# Patient Record
Sex: FEMALE | Race: OTHER | HISPANIC OR LATINO
[De-identification: names, ages, dates, MRNs, and addresses within clinical notes are randomized per-mention and may not be internally consistent; named-entity substitution may affect disease eponyms.]

---

## 2021-04-22 PROBLEM — Z00.00 ENCOUNTER FOR PREVENTIVE HEALTH EXAMINATION: Status: ACTIVE | Noted: 2021-04-22

## 2021-05-18 ENCOUNTER — APPOINTMENT (OUTPATIENT)
Dept: SURGERY | Facility: CLINIC | Age: 51
End: 2021-05-18
Payer: COMMERCIAL

## 2021-05-18 VITALS
BODY MASS INDEX: 30.24 KG/M2 | WEIGHT: 150 LBS | DIASTOLIC BLOOD PRESSURE: 76 MMHG | SYSTOLIC BLOOD PRESSURE: 128 MMHG | HEIGHT: 59 IN | HEART RATE: 82 BPM

## 2021-05-18 DIAGNOSIS — Z80.3 FAMILY HISTORY OF MALIGNANT NEOPLASM OF BREAST: ICD-10-CM

## 2021-05-18 DIAGNOSIS — Z87.891 PERSONAL HISTORY OF NICOTINE DEPENDENCE: ICD-10-CM

## 2021-05-18 DIAGNOSIS — Z78.9 OTHER SPECIFIED HEALTH STATUS: ICD-10-CM

## 2021-05-18 DIAGNOSIS — Z78.0 ASYMPTOMATIC MENOPAUSAL STATE: ICD-10-CM

## 2021-05-18 DIAGNOSIS — Z86.39 PERSONAL HISTORY OF OTHER ENDOCRINE, NUTRITIONAL AND METABOLIC DISEASE: ICD-10-CM

## 2021-05-18 PROCEDURE — 99204 OFFICE O/P NEW MOD 45 MIN: CPT

## 2021-05-18 RX ORDER — FAMOTIDINE 40 MG/1
40 TABLET, FILM COATED ORAL
Qty: 90 | Refills: 0 | Status: COMPLETED | COMMUNITY
Start: 2021-02-17

## 2021-05-18 RX ORDER — MELOXICAM 7.5 MG/1
7.5 TABLET ORAL
Qty: 30 | Refills: 0 | Status: COMPLETED | COMMUNITY
Start: 2021-04-15

## 2021-05-18 RX ORDER — ATORVASTATIN CALCIUM 40 MG/1
40 TABLET, FILM COATED ORAL
Qty: 90 | Refills: 0 | Status: ACTIVE | COMMUNITY
Start: 2021-02-17

## 2021-05-19 PROBLEM — Z78.0 HISTORY OF MENOPAUSE: Status: RESOLVED | Noted: 2021-05-19 | Resolved: 2021-05-19

## 2021-05-19 NOTE — ASSESSMENT
[FreeTextEntry1] : lengthy discussion regarding options for management. in view of labs and symptoms have recommended minimally invasive parathyroidectomy with PTH assay.  will require preop 4D CT and thyroid sonogram  risks, benefits and alternatives discussed at length.  I have discussed with the patient the anatomy of the area, the pathophysiology of the disease process and the rationale for surgery.  The attendant risks, possible complications and expected postoperative course have been discussed in detail.  I have given the patient the opportunity to ask questions, and all questions have been answered to the patient's satisfaction, and they wish to proceed with the planned procedure. to be scheduled ambulatory at [facility]

## 2021-05-19 NOTE — HISTORY OF PRESENT ILLNESS
[de-identified] : Pt c/o elevated calcium for 4 years,  bone pain and fatigue.  denies constipation or kidney stones, dysphagia, hoarseness or RT exposure. \par Ca 10.4,  PTH 88.6,  Vitamin  D 46,   24 Hr Urinary  Ca 145\par renal sonogram: no stones\par bone density: osteoporosis\par I have reviewed all old and new data and available images.  Additional information was obtained from others present at the time of visit to ensure the completeness of the history\par

## 2021-05-19 NOTE — REASON FOR VISIT
[Initial Consultation] : an initial consultation for [Spouse] : spouse [FreeTextEntry2] : Hyperparathyroidism

## 2021-05-19 NOTE — CONSULT LETTER
[Dear  ___] : Dear  [unfilled], [Consult Letter:] : I had the pleasure of evaluating your patient, [unfilled]. [Please see my note below.] : Please see my note below. [Consult Closing:] : Thank you very much for allowing me to participate in the care of this patient.  If you have any questions, please do not hesitate to contact me. [Sincerely,] : Sincerely, [FreeTextEntry2] : Dr. Sofie Cali, Dr. Kristen Caraballo [FreeTextEntry3] : Vega Olguin MD, FACS\par System Director, Endocrine Surgery\par [facility]\par Assistant Clinical Professor of Surgery\par [facility] [employer] [facility]  [DrBrett  ___] : Dr. FENG

## 2021-05-19 NOTE — PHYSICAL EXAM
[de-identified] : no palpable thyroid nodules [Laryngoscopy Performed] : laryngoscopy was performed, see procedure section for findings [Midline] : located in midline position [Normal] : orientation to person, place, and time: normal [de-identified] : indirect  laryngoscopy shows normal vocal cord mobility bilaterally with no lesions noted

## 2021-05-27 ENCOUNTER — OUTPATIENT (OUTPATIENT)
Dept: OUTPATIENT SERVICES | Facility: HOSPITAL | Age: 51
LOS: 1 days | End: 2021-05-27
Payer: COMMERCIAL

## 2021-05-27 ENCOUNTER — APPOINTMENT (OUTPATIENT)
Dept: CT IMAGING | Facility: IMAGING CENTER | Age: 51
End: 2021-05-27
Payer: COMMERCIAL

## 2021-05-27 ENCOUNTER — APPOINTMENT (OUTPATIENT)
Dept: ULTRASOUND IMAGING | Facility: IMAGING CENTER | Age: 51
End: 2021-05-27
Payer: COMMERCIAL

## 2021-05-27 DIAGNOSIS — E21.0 PRIMARY HYPERPARATHYROIDISM: ICD-10-CM

## 2021-05-27 DIAGNOSIS — R31.0 GROSS HEMATURIA: ICD-10-CM

## 2021-05-27 PROCEDURE — 70491 CT SOFT TISSUE NECK W/DYE: CPT | Mod: 26

## 2021-05-27 PROCEDURE — 76536 US EXAM OF HEAD AND NECK: CPT

## 2021-05-27 PROCEDURE — 76536 US EXAM OF HEAD AND NECK: CPT | Mod: 26

## 2021-05-27 PROCEDURE — 70492 CT SFT TSUE NCK W/O & W/DYE: CPT

## 2021-05-27 PROCEDURE — 82565 ASSAY OF CREATININE: CPT

## 2021-06-07 ENCOUNTER — NON-APPOINTMENT (OUTPATIENT)
Age: 51
End: 2021-06-07

## 2021-07-19 ENCOUNTER — OUTPATIENT (OUTPATIENT)
Dept: OUTPATIENT SERVICES | Facility: HOSPITAL | Age: 51
LOS: 1 days | End: 2021-07-19

## 2021-07-19 VITALS
DIASTOLIC BLOOD PRESSURE: 80 MMHG | OXYGEN SATURATION: 97 % | HEART RATE: 87 BPM | SYSTOLIC BLOOD PRESSURE: 125 MMHG | TEMPERATURE: 99 F | RESPIRATION RATE: 16 BRPM | WEIGHT: 154.98 LBS | HEIGHT: 58.5 IN

## 2021-07-19 DIAGNOSIS — E21.0 PRIMARY HYPERPARATHYROIDISM: ICD-10-CM

## 2021-07-19 DIAGNOSIS — E21.3 HYPERPARATHYROIDISM, UNSPECIFIED: ICD-10-CM

## 2021-07-19 LAB
ANION GAP SERPL CALC-SCNC: 15 MMOL/L — HIGH (ref 7–14)
BUN SERPL-MCNC: 19 MG/DL — SIGNIFICANT CHANGE UP (ref 7–23)
CALCIUM SERPL-MCNC: 10.6 MG/DL — HIGH (ref 8.4–10.5)
CHLORIDE SERPL-SCNC: 103 MMOL/L — SIGNIFICANT CHANGE UP (ref 98–107)
CO2 SERPL-SCNC: 23 MMOL/L — SIGNIFICANT CHANGE UP (ref 22–31)
CREAT SERPL-MCNC: 0.63 MG/DL — SIGNIFICANT CHANGE UP (ref 0.5–1.3)
GLUCOSE SERPL-MCNC: 98 MG/DL — SIGNIFICANT CHANGE UP (ref 70–99)
HCG UR QL: NEGATIVE — SIGNIFICANT CHANGE UP
HCT VFR BLD CALC: 43.6 % — SIGNIFICANT CHANGE UP (ref 34.5–45)
HGB BLD-MCNC: 14.3 G/DL — SIGNIFICANT CHANGE UP (ref 11.5–15.5)
MCHC RBC-ENTMCNC: 32.4 PG — SIGNIFICANT CHANGE UP (ref 27–34)
MCHC RBC-ENTMCNC: 32.8 GM/DL — SIGNIFICANT CHANGE UP (ref 32–36)
MCV RBC AUTO: 98.6 FL — SIGNIFICANT CHANGE UP (ref 80–100)
NRBC # BLD: 0 /100 WBCS — SIGNIFICANT CHANGE UP
NRBC # FLD: 0 K/UL — SIGNIFICANT CHANGE UP
PLATELET # BLD AUTO: 291 K/UL — SIGNIFICANT CHANGE UP (ref 150–400)
POTASSIUM SERPL-MCNC: 4.2 MMOL/L — SIGNIFICANT CHANGE UP (ref 3.5–5.3)
POTASSIUM SERPL-SCNC: 4.2 MMOL/L — SIGNIFICANT CHANGE UP (ref 3.5–5.3)
RBC # BLD: 4.42 M/UL — SIGNIFICANT CHANGE UP (ref 3.8–5.2)
RBC # FLD: 12.3 % — SIGNIFICANT CHANGE UP (ref 10.3–14.5)
SODIUM SERPL-SCNC: 141 MMOL/L — SIGNIFICANT CHANGE UP (ref 135–145)
WBC # BLD: 5.95 K/UL — SIGNIFICANT CHANGE UP (ref 3.8–10.5)
WBC # FLD AUTO: 5.95 K/UL — SIGNIFICANT CHANGE UP (ref 3.8–10.5)

## 2021-07-19 RX ORDER — SODIUM CHLORIDE 9 MG/ML
1000 INJECTION, SOLUTION INTRAVENOUS
Refills: 0 | Status: DISCONTINUED | OUTPATIENT
Start: 2021-07-30 | End: 2021-08-13

## 2021-07-19 NOTE — H&P PST ADULT - NSICDXPASTMEDICALHX_GEN_ALL_CORE_FT
PAST MEDICAL HISTORY:  H/O hyperparathyroidism     HLD (hyperlipidemia)      (normal spontaneous vaginal delivery) x 2

## 2021-07-19 NOTE — H&P PST ADULT - NSICDXFAMILYHX_GEN_ALL_CORE_FT
FAMILY HISTORY:  Father  Still living? Yes, Estimated age: Age Unknown  Family history of diabetes mellitus (DM), Age at diagnosis: Age Unknown    Mother  Still living? No  Family history of emphysema, Age at diagnosis: Age Unknown

## 2021-07-19 NOTE — H&P PST ADULT - NSANTHOSAYNRD_GEN_A_CORE
No. PALAK screening performed.  STOP BANG Legend: 0-2 = LOW Risk; 3-4 = INTERMEDIATE Risk; 5-8 = HIGH Risk

## 2021-07-19 NOTE — H&P PST ADULT - HISTORY OF PRESENT ILLNESS
50 y/o female with hx elevated calcium levels x 3 years  50 y/o female with hx elevated calcium levels x 3 years. Pt started c/o severe joint pains, was referred to Dr Olguin for further evaluation & surgery recommended.  Now scheduled for parathyroidectomy with parathyroid Hormone assay on 7/30/21.   Pt has hx HLD

## 2021-07-19 NOTE — H&P PST ADULT - RS GEN PE MLT RESP DETAILS PC
airway patent/breath sounds equal/good air movement/respirations non-labored/clear to auscultation bilaterally/no rales/no wheezes

## 2021-07-19 NOTE — H&P PST ADULT - NEGATIVE ENMT SYMPTOMS
no hearing difficulty/no tinnitus/no vertigo/no sinus symptoms/no nasal congestion/no nasal discharge/no nasal obstruction/no nose bleeds

## 2021-07-19 NOTE — H&P PST ADULT - NSICDXPROBLEM_GEN_ALL_CORE_FT
PROBLEM DIAGNOSES  Problem: Hyperparathyroidism  Assessment and Plan: scheduled for parathyroidectomy on 7/30/21  pending labs \surgical scrub & preop instructions given & explained, pt verbalized understanding with feedback  Covid vaccine dates on chart  Pt has Covid testing schedueld for 7/27/21

## 2021-07-19 NOTE — H&P PST ADULT - MUSCULOSKELETAL
details… c/o right shoulder pains/decreased ROM/decreased ROM due to pain/diminished strength detailed exam

## 2021-07-21 PROBLEM — E78.5 HYPERLIPIDEMIA, UNSPECIFIED: Chronic | Status: ACTIVE | Noted: 2021-07-19

## 2021-07-21 PROBLEM — Z86.39 PERSONAL HISTORY OF OTHER ENDOCRINE, NUTRITIONAL AND METABOLIC DISEASE: Chronic | Status: ACTIVE | Noted: 2021-07-19

## 2021-07-26 DIAGNOSIS — Z01.818 ENCOUNTER FOR OTHER PREPROCEDURAL EXAMINATION: ICD-10-CM

## 2021-07-27 ENCOUNTER — APPOINTMENT (OUTPATIENT)
Dept: DISASTER EMERGENCY | Facility: CLINIC | Age: 51
End: 2021-07-27

## 2021-07-27 LAB — SARS-COV-2 N GENE NPH QL NAA+PROBE: NOT DETECTED

## 2021-07-29 ENCOUNTER — TRANSCRIPTION ENCOUNTER (OUTPATIENT)
Age: 51
End: 2021-07-29

## 2021-07-29 NOTE — ASU PATIENT PROFILE, ADULT - SUPPORT PERSON NAME
Consent: The patient's consent was obtained including but not limited to risks of crusting, scabbing, scarring, blistering, darker or lighter pigmentary change, recurrence, incomplete removal and infection.
Cantharone Plus Duration Text (Please Remove Duration From Postcare): The patient was instructed to leave the Cantharone Plus on for 6-8 hours and then wash the area well with soap and water.
Canthacur Ps Duration Text (Please Remove Duration From Postcare): The patient was instructed to leave the Canthacur PS on for 6-8 hours and then wash the area well with soap and water.
Add 52 Modifier (Optional): no
Medical Necessity Clause: This procedure was medically necessary because the lesions that were treated were:
Medical Necessity Information: It is in your best interest to select a reason for this procedure from the list below. All of these items fulfill various CMS LCD requirements except the new and changing color options.
Cantharone Forte Duration Text (Please Remove Duration From Postcare): The patient was instructed to leave the Cantharone Forte on for 6-8 hours and then wash the area well with soap and water.
Cantharone Duration Text (Please Remove Duration From Postcare): The patient was instructed to leave the Cantharone on for 6-8 hours and then wash the area well with soap and water.
Canthacur Duration Text (Please Remove Duration From Postcare): The patient was instructed to leave the Canthacur on for 6-8 hours and then wash the area well with soap and water.
Post-Care Instructions: I reviewed with the patient in detail post-care instructions. The patient understands that the treated areas should be washed off 6 to 8 hours after application.
Curette Text: Prior to application of cantharidin the lesions were lightly pared with a curette.
Strength: Malinda
Detail Level: Detailed
anthomy

## 2021-07-30 ENCOUNTER — RESULT REVIEW (OUTPATIENT)
Age: 51
End: 2021-07-30

## 2021-07-30 ENCOUNTER — OUTPATIENT (OUTPATIENT)
Dept: OUTPATIENT SERVICES | Facility: HOSPITAL | Age: 51
LOS: 1 days | Discharge: HOME | End: 2021-07-30
Payer: COMMERCIAL

## 2021-07-30 ENCOUNTER — APPOINTMENT (OUTPATIENT)
Dept: SURGERY | Facility: HOSPITAL | Age: 51
End: 2021-07-30

## 2021-07-30 VITALS
OXYGEN SATURATION: 98 % | RESPIRATION RATE: 16 BRPM | SYSTOLIC BLOOD PRESSURE: 118 MMHG | HEART RATE: 75 BPM | DIASTOLIC BLOOD PRESSURE: 75 MMHG

## 2021-07-30 VITALS
HEART RATE: 85 BPM | WEIGHT: 154.98 LBS | TEMPERATURE: 99 F | SYSTOLIC BLOOD PRESSURE: 124 MMHG | DIASTOLIC BLOOD PRESSURE: 78 MMHG | RESPIRATION RATE: 16 BRPM | OXYGEN SATURATION: 98 % | HEIGHT: 58 IN

## 2021-07-30 DIAGNOSIS — E21.0 PRIMARY HYPERPARATHYROIDISM: ICD-10-CM

## 2021-07-30 LAB
HCG UR QL: NEGATIVE — SIGNIFICANT CHANGE UP
PTH INTACT, INTRAOPERATIVE 2: 80 PG/ML — HIGH (ref 15–65)
PTH INTACT, INTRAOPERATIVE 3: 33 PG/ML — SIGNIFICANT CHANGE UP (ref 15–65)
PTH INTACT, INTRAOPERATIVE 4: 31 PG/ML — SIGNIFICANT CHANGE UP (ref 15–65)
PTH-INTACT IO % DIF SERPL: 85 PG/ML — HIGH (ref 15–65)

## 2021-07-30 PROCEDURE — 88305 TISSUE EXAM BY PATHOLOGIST: CPT | Mod: 26

## 2021-07-30 PROCEDURE — 60500 EXPLORE PARATHYROID GLANDS: CPT

## 2021-07-30 PROCEDURE — 88331 PATH CONSLTJ SURG 1 BLK 1SPC: CPT | Mod: 26

## 2021-07-30 RX ORDER — OXYCODONE HYDROCHLORIDE 5 MG/1
10 TABLET ORAL ONCE
Refills: 0 | Status: DISCONTINUED | OUTPATIENT
Start: 2021-07-30 | End: 2021-07-30

## 2021-07-30 RX ORDER — OXYCODONE HYDROCHLORIDE 5 MG/1
5 TABLET ORAL ONCE
Refills: 0 | Status: DISCONTINUED | OUTPATIENT
Start: 2021-07-30 | End: 2021-07-30

## 2021-07-30 RX ORDER — ACETAMINOPHEN 500 MG
2 TABLET ORAL
Qty: 0 | Refills: 0 | DISCHARGE
Start: 2021-07-30

## 2021-07-30 RX ORDER — HYDROMORPHONE HYDROCHLORIDE 2 MG/ML
0.5 INJECTION INTRAMUSCULAR; INTRAVENOUS; SUBCUTANEOUS
Refills: 0 | Status: DISCONTINUED | OUTPATIENT
Start: 2021-07-30 | End: 2021-07-30

## 2021-07-30 RX ORDER — CALCIUM CARBONATE 500(1250)
1 TABLET ORAL EVERY 6 HOURS
Refills: 0 | Status: DISCONTINUED | OUTPATIENT
Start: 2021-07-30 | End: 2021-08-13

## 2021-07-30 RX ORDER — ACETAMINOPHEN 500 MG
650 TABLET ORAL EVERY 6 HOURS
Refills: 0 | Status: DISCONTINUED | OUTPATIENT
Start: 2021-07-30 | End: 2021-08-13

## 2021-07-30 RX ORDER — CALCIUM CARBONATE 500(1250)
1 TABLET ORAL
Qty: 0 | Refills: 0 | DISCHARGE
Start: 2021-07-30

## 2021-07-30 RX ORDER — ATORVASTATIN CALCIUM 80 MG/1
1 TABLET, FILM COATED ORAL
Qty: 0 | Refills: 0 | DISCHARGE

## 2021-07-30 RX ORDER — ONDANSETRON 8 MG/1
4 TABLET, FILM COATED ORAL ONCE
Refills: 0 | Status: DISCONTINUED | OUTPATIENT
Start: 2021-07-30 | End: 2021-08-13

## 2021-07-30 RX ORDER — BENZOCAINE AND MENTHOL 5; 1 G/100ML; G/100ML
1 LIQUID ORAL
Refills: 0 | Status: DISCONTINUED | OUTPATIENT
Start: 2021-07-30 | End: 2021-08-13

## 2021-07-30 RX ORDER — HYDROMORPHONE HYDROCHLORIDE 2 MG/ML
1 INJECTION INTRAMUSCULAR; INTRAVENOUS; SUBCUTANEOUS
Refills: 0 | Status: DISCONTINUED | OUTPATIENT
Start: 2021-07-30 | End: 2021-07-30

## 2021-07-30 RX ORDER — OXYCODONE AND ACETAMINOPHEN 5; 325 MG/1; MG/1
1 TABLET ORAL EVERY 6 HOURS
Refills: 0 | Status: DISCONTINUED | OUTPATIENT
Start: 2021-07-30 | End: 2021-07-30

## 2021-07-30 RX ADMIN — Medication 1 TABLET(S): at 09:28

## 2021-07-30 NOTE — ASU DISCHARGE PLAN (ADULT/PEDIATRIC) - CALL YOUR DOCTOR IF YOU HAVE ANY OF THE FOLLOWING:
Bleeding that does not stop/Swelling that gets worse/Pain not relieved by Medications/Nausea and vomiting that does not stop

## 2021-07-30 NOTE — ASU DISCHARGE PLAN (ADULT/PEDIATRIC) - NURSING INSTRUCTIONS
You received IV Tylenol for pain management at 06:00. Please DO NOT take any Tylenol (Acetaminophen) containing products, such as Vicodin, Percocet, Excedrin, and cold medications for the next 6 hours (until 12:00 PM). DO NOT TAKE MORE THAN 3000 MG OF TYLENOL in a 24 hour period.

## 2021-07-30 NOTE — ASU DISCHARGE PLAN (ADULT/PEDIATRIC) - CARE PROVIDER_API CALL
Vega Olguin)  Plastic Surgery  [address] [place] [address] [address]  [place] [address]  Phone: [phone]  Fax: [phone]  Follow Up Time:

## 2021-07-30 NOTE — BRIEF OPERATIVE NOTE - NSICDXBRIEFPOSTOP_GEN_ALL_CORE_FT
average for gestational age POST-OP DIAGNOSIS:  Parathyroid adenoma 30-Jul-2021 08:34:13  Clayton Spencre

## 2021-07-30 NOTE — BRIEF OPERATIVE NOTE - OPERATION/FINDINGS
Incision was made below cricothyroid. Dissection down to level of inferior parathyroid gland on left side with visualization of inferior thyroid artery and left recurrent laryngeal nerve. Blood supply to adenoma was cut off and adenoma resected. Superior gland was visualized and of normal size. Platysma muscle and skin was closed.

## 2021-08-04 LAB — SURGICAL PATHOLOGY STUDY: SIGNIFICANT CHANGE UP

## 2021-08-06 ENCOUNTER — INPATIENT (INPATIENT)
Facility: HOSPITAL | Age: 51
LOS: 1 days | Discharge: HOME | DRG: 392 | End: 2021-08-08
Attending: STUDENT IN AN ORGANIZED HEALTH CARE EDUCATION/TRAINING PROGRAM | Admitting: SURGERY
Payer: COMMERCIAL

## 2021-08-06 VITALS
HEIGHT: 58 IN | WEIGHT: 154.1 LBS | TEMPERATURE: 99 F | HEART RATE: 103 BPM | RESPIRATION RATE: 18 BRPM | SYSTOLIC BLOOD PRESSURE: 113 MMHG | DIASTOLIC BLOOD PRESSURE: 77 MMHG | OXYGEN SATURATION: 96 %

## 2021-08-06 DIAGNOSIS — K57.92 DIVERTICULITIS OF INTESTINE, PART UNSPECIFIED, WITHOUT PERFORATION OR ABSCESS WITHOUT BLEEDING: ICD-10-CM

## 2021-08-06 LAB
ALBUMIN SERPL ELPH-MCNC: 4.1 G/DL — SIGNIFICANT CHANGE UP (ref 3.3–5)
ALP SERPL-CCNC: 208 U/L — HIGH (ref 40–120)
ALT FLD-CCNC: 62 U/L — HIGH (ref 10–45)
ANION GAP SERPL CALC-SCNC: 15 MMOL/L — SIGNIFICANT CHANGE UP (ref 5–17)
APTT BLD: 31.8 SEC — SIGNIFICANT CHANGE UP (ref 27.5–35.5)
AST SERPL-CCNC: 44 U/L — HIGH (ref 10–40)
BASOPHILS # BLD AUTO: 0.08 K/UL — SIGNIFICANT CHANGE UP (ref 0–0.2)
BASOPHILS NFR BLD AUTO: 1.1 % — SIGNIFICANT CHANGE UP (ref 0–2)
BILIRUB SERPL-MCNC: 0.5 MG/DL — SIGNIFICANT CHANGE UP (ref 0.2–1.2)
BLD GP AB SCN SERPL QL: NEGATIVE — SIGNIFICANT CHANGE UP
BUN SERPL-MCNC: 7 MG/DL — SIGNIFICANT CHANGE UP (ref 7–23)
CALCIUM SERPL-MCNC: 9.4 MG/DL — SIGNIFICANT CHANGE UP (ref 8.4–10.5)
CHLORIDE SERPL-SCNC: 98 MMOL/L — SIGNIFICANT CHANGE UP (ref 96–108)
CO2 SERPL-SCNC: 20 MMOL/L — LOW (ref 22–31)
CREAT SERPL-MCNC: 0.61 MG/DL — SIGNIFICANT CHANGE UP (ref 0.5–1.3)
EOSINOPHIL # BLD AUTO: 0.19 K/UL — SIGNIFICANT CHANGE UP (ref 0–0.5)
EOSINOPHIL NFR BLD AUTO: 2.6 % — SIGNIFICANT CHANGE UP (ref 0–6)
GAS PNL BLDV: SIGNIFICANT CHANGE UP
GLUCOSE SERPL-MCNC: 84 MG/DL — SIGNIFICANT CHANGE UP (ref 70–99)
HCG SERPL-ACNC: <2 MIU/ML — SIGNIFICANT CHANGE UP
HCT VFR BLD CALC: 40.1 % — SIGNIFICANT CHANGE UP (ref 34.5–45)
HGB BLD-MCNC: 13.6 G/DL — SIGNIFICANT CHANGE UP (ref 11.5–15.5)
IMM GRANULOCYTES NFR BLD AUTO: 0.4 % — SIGNIFICANT CHANGE UP (ref 0–1.5)
INR BLD: 1.18 RATIO — HIGH (ref 0.88–1.16)
LYMPHOCYTES # BLD AUTO: 2.12 K/UL — SIGNIFICANT CHANGE UP (ref 1–3.3)
LYMPHOCYTES # BLD AUTO: 29.6 % — SIGNIFICANT CHANGE UP (ref 13–44)
MCHC RBC-ENTMCNC: 32.7 PG — SIGNIFICANT CHANGE UP (ref 27–34)
MCHC RBC-ENTMCNC: 33.9 GM/DL — SIGNIFICANT CHANGE UP (ref 32–36)
MCV RBC AUTO: 96.4 FL — SIGNIFICANT CHANGE UP (ref 80–100)
MONOCYTES # BLD AUTO: 0.39 K/UL — SIGNIFICANT CHANGE UP (ref 0–0.9)
MONOCYTES NFR BLD AUTO: 5.4 % — SIGNIFICANT CHANGE UP (ref 2–14)
NEUTROPHILS # BLD AUTO: 4.36 K/UL — SIGNIFICANT CHANGE UP (ref 1.8–7.4)
NEUTROPHILS NFR BLD AUTO: 60.9 % — SIGNIFICANT CHANGE UP (ref 43–77)
NRBC # BLD: 0 /100 WBCS — SIGNIFICANT CHANGE UP (ref 0–0)
PLATELET # BLD AUTO: 351 K/UL — SIGNIFICANT CHANGE UP (ref 150–400)
POTASSIUM SERPL-MCNC: 4.1 MMOL/L — SIGNIFICANT CHANGE UP (ref 3.5–5.3)
POTASSIUM SERPL-SCNC: 4.1 MMOL/L — SIGNIFICANT CHANGE UP (ref 3.5–5.3)
PROT SERPL-MCNC: 7.8 G/DL — SIGNIFICANT CHANGE UP (ref 6–8.3)
PROTHROM AB SERPL-ACNC: 14 SEC — HIGH (ref 10.6–13.6)
RBC # BLD: 4.16 M/UL — SIGNIFICANT CHANGE UP (ref 3.8–5.2)
RBC # FLD: 11.5 % — SIGNIFICANT CHANGE UP (ref 10.3–14.5)
RH IG SCN BLD-IMP: POSITIVE — SIGNIFICANT CHANGE UP
SODIUM SERPL-SCNC: 133 MMOL/L — LOW (ref 135–145)
WBC # BLD: 7.17 K/UL — SIGNIFICANT CHANGE UP (ref 3.8–10.5)
WBC # FLD AUTO: 7.17 K/UL — SIGNIFICANT CHANGE UP (ref 3.8–10.5)

## 2021-08-06 PROCEDURE — 74177 CT ABD & PELVIS W/CONTRAST: CPT | Mod: 26,ME

## 2021-08-06 PROCEDURE — 99285 EMERGENCY DEPT VISIT HI MDM: CPT

## 2021-08-06 PROCEDURE — 93010 ELECTROCARDIOGRAM REPORT: CPT

## 2021-08-06 PROCEDURE — G1004: CPT

## 2021-08-06 RX ORDER — SODIUM CHLORIDE 9 MG/ML
1000 INJECTION, SOLUTION INTRAVENOUS ONCE
Refills: 0 | Status: COMPLETED | OUTPATIENT
Start: 2021-08-06 | End: 2021-08-06

## 2021-08-06 RX ORDER — PIPERACILLIN AND TAZOBACTAM 4; .5 G/20ML; G/20ML
3.38 INJECTION, POWDER, LYOPHILIZED, FOR SOLUTION INTRAVENOUS ONCE
Refills: 0 | Status: COMPLETED | OUTPATIENT
Start: 2021-08-06 | End: 2021-08-06

## 2021-08-06 RX ORDER — ACETAMINOPHEN 500 MG
975 TABLET ORAL ONCE
Refills: 0 | Status: COMPLETED | OUTPATIENT
Start: 2021-08-06 | End: 2021-08-06

## 2021-08-06 RX ADMIN — PIPERACILLIN AND TAZOBACTAM 200 GRAM(S): 4; .5 INJECTION, POWDER, LYOPHILIZED, FOR SOLUTION INTRAVENOUS at 19:17

## 2021-08-06 RX ADMIN — SODIUM CHLORIDE 1000 MILLILITER(S): 9 INJECTION, SOLUTION INTRAVENOUS at 19:17

## 2021-08-06 RX ADMIN — SODIUM CHLORIDE 1000 MILLILITER(S): 9 INJECTION, SOLUTION INTRAVENOUS at 21:15

## 2021-08-06 RX ADMIN — Medication 975 MILLIGRAM(S): at 19:17

## 2021-08-06 NOTE — ED PROVIDER NOTE - CLINICAL SUMMARY MEDICAL DECISION MAKING FREE TEXT BOX
Impression: 52yo F pmhx of parathyroid removal surgery, HLD comes to ED w/ LLQ abd pain. Their symptoms of LLQ pain, exam findings of LLQ tenderness are concerning for diverticulitis. Plan to work up for diverticulitis. Impression: 52yo F pmhx of parathyroid removal surgery, HLD comes to ED w/ LLQ abd pain. Their symptoms of LLQ pain, exam findings of LLQ tenderness are concerning for diverticulitis. Plan to work up for diverticulitis.    Attending MD Jacobo : 51 F hx diverticulitis outpt with abscess (?) now with worsening LLQ pain. Told to come in today. TTP LLQ. Will empiriaclly start abx and surg c/s.

## 2021-08-06 NOTE — H&P ADULT - HISTORY OF PRESENT ILLNESS
Ms. Duncan is a 51 Year-Old Lady with recent parathyroidectomy last week, HLD, presenting with 4 days of abdominal pain. Was started on augmentin per her PCP for presumed diverticulitis for the last 3 days, drank CLD. Had similar episode 8 years ago, treated with antibiotics. Also notes diarrhea for past 4 days. No fevers or chills. Has been tolerating CLD. However her pain worsened so she underwent outpatient CT scan concerning for diverticulitis with abscess.     On evaluation in the Emergency Department, vitals were within normal limits. Labs drawn, notable for elevated LFTs, otherwise normal WBC. CT Imaging obtained, with findings of sigmoid and descending diverticulitis with phlegmonous changes consistent with microperforation.

## 2021-08-06 NOTE — H&P ADULT - ASSESSMENT
Ms. Duncan is a 51 Year-Old Lady with recent parathyroidectomy last week, HLD, presenting with 4 days of abdominal pain which worsened on Per Os antibiotics, found to have diverticulitis with phlegmonous changes consistent with microperforation.    - Admit to Surgery, Dr. Nunez.   - Intravenous antibiotic administration: Ertapenem given outpatient failure on Augmentin.   - Clear Liquid Diet, to advance as tolerated.   - Serial abdominal exams.   - Will obtain RUQ to evaluate for elevated LFTs.   - Discussed with Attending Surgeon.     Please contact Trauma and Acute Care Surgery at #[phone] with any questions or concerns.

## 2021-08-06 NOTE — ED PROVIDER NOTE - PROGRESS NOTE DETAILS
Zoltan: called by radiology, concern for extraluminal free air on scan, re contacting surgery to make them aware.

## 2021-08-06 NOTE — ED ADULT NURSE REASSESSMENT NOTE - NS ED NURSE REASSESS COMMENT FT1
Handoff report received from FRANK Navas. Pt resting comfortably in purple 21 on the right with  at bedside. Pt A&O x 4, VSS. Pt report 9/10 pain in LLQ. Bed in lowest position and side rails up.

## 2021-08-06 NOTE — ED PROVIDER NOTE - NSCAREINITIATED _GEN_ER
This is an 83 year old female with a significant PMHx of CAD/Stent (3 in 2017), HFrEF, Atrial Fibrillation/Eliquis, and T2DM who presented with a cc/o chest pain. Her chest pain was similar to chest pain she felt in the past. Her pain is substernal, intermittent (episodes 10-15mins in duration), and 7/10 on the pain scale. Her pain is exacerbated with minimal exertion. Her symptoms are associated with dyspnea. In the ED her vital signs were stable, EKG showed TWI V3-V6, Troponin was mildly elevated 0.05. Patient was admitted to telemetry, started on heparin drip, , and Lipitor, serial troponin showed stable troponin 0.04, repeated EKG showed no changes, cardiology consulted recommended to transfer to [facility] as per patient and family request.             Physical Exam:    General: WN/WD NAD, appears anxious and tearful - life changes and childhood memories causing stress    Neurology: A&Ox3, nonfocal, follows commands    Eyes: PERRLA/ EOMI    ENT/Neck: Neck supple, trachea midline, No JVD    Respiratory: CTA B/L, No wheezing, rales, rhonchi    CV: Normal rate regular rhythm, S1S2, no murmurs, rubs or gallops    Abdominal: Soft, NT, ND +BS,     Extremities: No edema, + peripheral pulses    Skin: No Rashes, Hematoma, Ecchymosis    Incisions: n/a    Tubes: n/a        A/P    Acute coronary syndrome    CAD:     -serial Chon and EKG    -ASA, Coreg, Lipitor.     Transfer to [facility] as per patient request, possible need for cardiac cath.         A.Fib- rate controlled    Sinus rhythm now.     Continue Eliquis and Coreg.             ELOISE on CKD III possible 2/2 dehydration (BUN/Cr =87/2.5)    Hold Losartan for now.             HTN - monitoring off ARB and started on BBlocker         Hypercalcemia     Mild    Hold Calcitrol for now, check PTH        Anxiety / ?? depression     -patient to f/u w/ outpatient PMD / therapist . Nirmala Reynoso(Resident)

## 2021-08-06 NOTE — ED PROVIDER NOTE - OBJECTIVE STATEMENT
52yo F pmhx of parathyroid removal surgery, HLD comes to ED w/ LLQ abd pain. Went to their primary care for abd pain 3 days ago and was advised to go to [facility] for abd CT scanning. On scanning diverticulitis with abscess was found. Due to those findings patient was sent to the ED. Patient pain is 10/10, LLQ, constant, worse with po intake, non radiating. On amoxicillin 2 1/2 days.

## 2021-08-06 NOTE — ED ADULT NURSE NOTE - OBJECTIVE STATEMENT
52yo F, hx diverticulitis. went to her PMD for pain flare, was sent for CT which showed an abcsess on the diverticulitis. sent to ED. pt reports pain to LLQ. no n/v/d/f/c. is aaox4, appears well otherwise,  at bedside. able to make needs known. comfort measures provided to best of my ability. pending MD delaney. assessment will be ongoing

## 2021-08-06 NOTE — ED PROVIDER NOTE - NS ED ROS FT
12/24/19 HYPOPYON IMPROVED, VIEW IMPROVED,. Constitutional: no fevers, chills  HEENT: no cough, rhinorrhea  Cardiac: no chest pain, palpitations  Respiratory: no SOB  GI: nausea, diarrhea. no vomiting, abd pain, bloody or dark stools  : no dysuria, frequency, or hematuria  MSK: no joint pain  Skin: no rashes  Neuro: no headache, change in vision, weakness  Psych: negative

## 2021-08-06 NOTE — H&P ADULT - NSHPLABSRESULTS_GEN_ALL_CORE
Labs:                       13.6   7.17  )-----------( 351      ( 06 Aug 2021 19:30 )             40.1   08-06    133<L>  |  98  |  7   ----------------------------<  84  4.1   |  20<L>  |  0.61    Ca    9.4      06 Aug 2021 19:30    TPro  7.8  /  Alb  4.1  /  TBili  0.5  /  DBili  x   /  AST  44<H>  /  ALT  62<H>  /  AlkPhos  208<H>  08-06  PT/INR - ( 06 Aug 2021 19:30 )   PT: 14.0 sec;   INR: 1.18 ratio         PTT - ( 06 Aug 2021 19:30 )  PTT:31.8 sec    < from: CT Abdomen and Pelvis w/ IV Cont (08.06.21 @ 21:08) >      FINDINGS:    LOWER CHEST: Scattered peripheral ground glass airspace opacities in the lower lobes bilaterally.  LIVER: No change in subcentimeter hepatic dome hypodense focus, too small to characterize.  BILE DUCTS: Normal caliber.  GALLBLADDER: Within normal limits.  SPLEEN: Within normal limits.  PANCREAS: Within normal limits.  ADRENALS: Within normal limits.    KIDNEYS/URETERS: Symmetric bilateral renal parenchymal enhancement. No hydronephrosis.  BLADDER: Increased intraluminal density may be from previous contrast excretion.  REPRODUCTIVE ORGANS: Uterus and adnexa within normal limits.    BOWEL: No bowel obstruction. Appendix is normal. Two sites of focal wall thickening surrounding the inflamed diverticulum in the region of sigmoid and mid-descending colon. Foci of extraluminal air are noted adjacent to the inflammation with surrounding fat stranding. No discrete adjacent fluid collection.  PERITONEUM: No free air.  VESSELS: Within normal limits.  RETROPERITONEUM/LYMPH NODES: No lymphadenopathy.  ABDOMINAL WALL: Small fat-containing umbilical hernia.  BONES: Mild degenerative changes.    IMPRESSION:    Acute sigmoid and mid descending colonic diverticulitis with associated phlegmonous change likely due to contained microperforation. No focal fluid collection.    Scattered peripheral ground glass airspace opacities in the lower lobes bilaterally likely due to atelectasis, but superimposed infection not excluded.    < end of copied text >

## 2021-08-06 NOTE — ED PROVIDER NOTE - PHYSICAL EXAMINATION
General: non-toxic, NAD  HEENT: NCAT, PERRL  Cardiac: RRR, no murmurs, 2+ peripheral pulses  Chest: CTA  Abdomen: TTP LUQ and LLQ, no rebound or guarding. soft, non-distended, bowel sounds present.  Extremities: no peripheral edema, calf tenderness, or leg size discrepancies  Skin: no rashes  Neuro: AAOx4, 5+motor, sensory grossly intact  Psych: mood and affect appropriate

## 2021-08-06 NOTE — H&P ADULT - NSHPPHYSICALEXAM_GEN_ALL_CORE
General: No Acute Distress.  Neuro: Alert and oriented, no focal deficits, moves all extremities spontaneously.  HEENT: Extraocular movements intact. Mucosa moist.   Respiratory: Airway patent, respirations unlabored.  Cardiovascular: Pulse present.   Abdomen: Soft, tender in lower abdomen and LLQ, nondistended.  Genitourinary: No obvious lesions.  Extremities: Warm, moves all four.  Musculoskeletal: No tenderness of extremities, mobile joints.   Integumentary: No obvious lesions.

## 2021-08-07 LAB
ALBUMIN SERPL ELPH-MCNC: 3.6 G/DL — SIGNIFICANT CHANGE UP (ref 3.3–5)
ALP SERPL-CCNC: 165 U/L — HIGH (ref 40–120)
ALT FLD-CCNC: 45 U/L — SIGNIFICANT CHANGE UP (ref 10–45)
ANION GAP SERPL CALC-SCNC: 14 MMOL/L — SIGNIFICANT CHANGE UP (ref 5–17)
APPEARANCE UR: CLEAR — SIGNIFICANT CHANGE UP
AST SERPL-CCNC: 27 U/L — SIGNIFICANT CHANGE UP (ref 10–40)
BILIRUB SERPL-MCNC: 0.3 MG/DL — SIGNIFICANT CHANGE UP (ref 0.2–1.2)
BILIRUB UR-MCNC: NEGATIVE — SIGNIFICANT CHANGE UP
BUN SERPL-MCNC: 7 MG/DL — SIGNIFICANT CHANGE UP (ref 7–23)
CALCIUM SERPL-MCNC: 8.9 MG/DL — SIGNIFICANT CHANGE UP (ref 8.4–10.5)
CHLORIDE SERPL-SCNC: 103 MMOL/L — SIGNIFICANT CHANGE UP (ref 96–108)
CO2 SERPL-SCNC: 22 MMOL/L — SIGNIFICANT CHANGE UP (ref 22–31)
COLOR SPEC: COLORLESS — SIGNIFICANT CHANGE UP
CREAT SERPL-MCNC: 0.55 MG/DL — SIGNIFICANT CHANGE UP (ref 0.5–1.3)
DIFF PNL FLD: NEGATIVE — SIGNIFICANT CHANGE UP
GLUCOSE SERPL-MCNC: 89 MG/DL — SIGNIFICANT CHANGE UP (ref 70–99)
GLUCOSE UR QL: NEGATIVE — SIGNIFICANT CHANGE UP
HCT VFR BLD CALC: 36.4 % — SIGNIFICANT CHANGE UP (ref 34.5–45)
HGB BLD-MCNC: 12.4 G/DL — SIGNIFICANT CHANGE UP (ref 11.5–15.5)
KETONES UR-MCNC: NEGATIVE — SIGNIFICANT CHANGE UP
LEUKOCYTE ESTERASE UR-ACNC: NEGATIVE — SIGNIFICANT CHANGE UP
MAGNESIUM SERPL-MCNC: 2.1 MG/DL — SIGNIFICANT CHANGE UP (ref 1.6–2.6)
MCHC RBC-ENTMCNC: 32.9 PG — SIGNIFICANT CHANGE UP (ref 27–34)
MCHC RBC-ENTMCNC: 34.1 GM/DL — SIGNIFICANT CHANGE UP (ref 32–36)
MCV RBC AUTO: 96.6 FL — SIGNIFICANT CHANGE UP (ref 80–100)
NITRITE UR-MCNC: NEGATIVE — SIGNIFICANT CHANGE UP
NRBC # BLD: 0 /100 WBCS — SIGNIFICANT CHANGE UP (ref 0–0)
PH UR: 7 — SIGNIFICANT CHANGE UP (ref 5–8)
PHOSPHATE SERPL-MCNC: 3.1 MG/DL — SIGNIFICANT CHANGE UP (ref 2.5–4.5)
PLATELET # BLD AUTO: 295 K/UL — SIGNIFICANT CHANGE UP (ref 150–400)
POTASSIUM SERPL-MCNC: 3.9 MMOL/L — SIGNIFICANT CHANGE UP (ref 3.5–5.3)
POTASSIUM SERPL-SCNC: 3.9 MMOL/L — SIGNIFICANT CHANGE UP (ref 3.5–5.3)
PROT SERPL-MCNC: 6.5 G/DL — SIGNIFICANT CHANGE UP (ref 6–8.3)
PROT UR-MCNC: NEGATIVE — SIGNIFICANT CHANGE UP
RBC # BLD: 3.77 M/UL — LOW (ref 3.8–5.2)
RBC # FLD: 11.4 % — SIGNIFICANT CHANGE UP (ref 10.3–14.5)
SARS-COV-2 RNA SPEC QL NAA+PROBE: SIGNIFICANT CHANGE UP
SODIUM SERPL-SCNC: 139 MMOL/L — SIGNIFICANT CHANGE UP (ref 135–145)
SP GR SPEC: 1.05 — HIGH (ref 1.01–1.02)
UROBILINOGEN FLD QL: NEGATIVE — SIGNIFICANT CHANGE UP
WBC # BLD: 4.76 K/UL — SIGNIFICANT CHANGE UP (ref 3.8–10.5)
WBC # FLD AUTO: 4.76 K/UL — SIGNIFICANT CHANGE UP (ref 3.8–10.5)

## 2021-08-07 PROCEDURE — 99253 IP/OBS CNSLTJ NEW/EST LOW 45: CPT

## 2021-08-07 RX ORDER — METRONIDAZOLE 500 MG
500 TABLET ORAL EVERY 8 HOURS
Refills: 0 | Status: DISCONTINUED | OUTPATIENT
Start: 2021-08-07 | End: 2021-08-08

## 2021-08-07 RX ORDER — SELENIOUS ACID 40 UG/ML
1 INJECTION, SOLUTION INTRAVENOUS
Qty: 0 | Refills: 0 | DISCHARGE

## 2021-08-07 RX ORDER — ERGOCALCIFEROL 1.25 MG/1
1 CAPSULE ORAL
Qty: 0 | Refills: 0 | DISCHARGE

## 2021-08-07 RX ORDER — PREGABALIN 225 MG/1
1 CAPSULE ORAL
Qty: 0 | Refills: 0 | DISCHARGE

## 2021-08-07 RX ORDER — ENOXAPARIN SODIUM 100 MG/ML
40 INJECTION SUBCUTANEOUS DAILY
Refills: 0 | Status: DISCONTINUED | OUTPATIENT
Start: 2021-08-07 | End: 2021-08-08

## 2021-08-07 RX ORDER — ERTAPENEM SODIUM 1 G/1
1000 INJECTION, POWDER, LYOPHILIZED, FOR SOLUTION INTRAMUSCULAR; INTRAVENOUS EVERY 24 HOURS
Refills: 0 | Status: DISCONTINUED | OUTPATIENT
Start: 2021-08-07 | End: 2021-08-07

## 2021-08-07 RX ORDER — POTASSIUM CHLORIDE 20 MEQ
10 PACKET (EA) ORAL ONCE
Refills: 0 | Status: COMPLETED | OUTPATIENT
Start: 2021-08-07 | End: 2021-08-07

## 2021-08-07 RX ORDER — CIPROFLOXACIN LACTATE 400MG/40ML
500 VIAL (ML) INTRAVENOUS EVERY 12 HOURS
Refills: 0 | Status: DISCONTINUED | OUTPATIENT
Start: 2021-08-07 | End: 2021-08-08

## 2021-08-07 RX ORDER — POTASSIUM CHLORIDE 20 MEQ
10 PACKET (EA) ORAL ONCE
Refills: 0 | Status: DISCONTINUED | OUTPATIENT
Start: 2021-08-07 | End: 2021-08-07

## 2021-08-07 RX ORDER — ATORVASTATIN CALCIUM 80 MG/1
40 TABLET, FILM COATED ORAL AT BEDTIME
Refills: 0 | Status: DISCONTINUED | OUTPATIENT
Start: 2021-08-07 | End: 2021-08-08

## 2021-08-07 RX ORDER — L.ACIDOPH/B.ANIMALIS/B.LONGUM 15B CELL
1 CAPSULE ORAL
Qty: 0 | Refills: 0 | DISCHARGE

## 2021-08-07 RX ORDER — ACETAMINOPHEN 500 MG
975 TABLET ORAL ONCE
Refills: 0 | Status: DISCONTINUED | OUTPATIENT
Start: 2021-08-07 | End: 2021-08-08

## 2021-08-07 RX ORDER — MILK THISTLE 180 MG
1 CAPSULE ORAL
Qty: 0 | Refills: 0 | DISCHARGE

## 2021-08-07 RX ORDER — ACETAMINOPHEN 500 MG
1000 TABLET ORAL ONCE
Refills: 0 | Status: COMPLETED | OUTPATIENT
Start: 2021-08-07 | End: 2021-08-07

## 2021-08-07 RX ORDER — DEXTROSE MONOHYDRATE, SODIUM CHLORIDE, AND POTASSIUM CHLORIDE 50; .745; 4.5 G/1000ML; G/1000ML; G/1000ML
1000 INJECTION, SOLUTION INTRAVENOUS
Refills: 0 | Status: DISCONTINUED | OUTPATIENT
Start: 2021-08-07 | End: 2021-08-08

## 2021-08-07 RX ADMIN — ERTAPENEM SODIUM 120 MILLIGRAM(S): 1 INJECTION, POWDER, LYOPHILIZED, FOR SOLUTION INTRAMUSCULAR; INTRAVENOUS at 03:24

## 2021-08-07 RX ADMIN — ATORVASTATIN CALCIUM 40 MILLIGRAM(S): 80 TABLET, FILM COATED ORAL at 21:11

## 2021-08-07 RX ADMIN — ENOXAPARIN SODIUM 40 MILLIGRAM(S): 100 INJECTION SUBCUTANEOUS at 12:18

## 2021-08-07 RX ADMIN — Medication 500 MILLIGRAM(S): at 21:11

## 2021-08-07 RX ADMIN — Medication 500 MILLIGRAM(S): at 17:12

## 2021-08-07 RX ADMIN — DEXTROSE MONOHYDRATE, SODIUM CHLORIDE, AND POTASSIUM CHLORIDE 50 MILLILITER(S): 50; .745; 4.5 INJECTION, SOLUTION INTRAVENOUS at 03:24

## 2021-08-07 RX ADMIN — Medication 500 MILLIGRAM(S): at 14:09

## 2021-08-07 RX ADMIN — Medication 400 MILLIGRAM(S): at 03:24

## 2021-08-07 RX ADMIN — Medication 100 MILLIEQUIVALENT(S): at 10:33

## 2021-08-07 RX ADMIN — Medication 1000 MILLIGRAM(S): at 03:54

## 2021-08-07 NOTE — PROGRESS NOTE ADULT - ASSESSMENT
Ms. Duncan is a 51 Year-Old Lady with recent parathyroidectomy last week, HLD, presenting with 4 days of abdominal pain which worsened on PO antibiotics, found to have diverticulitis with phlegmonous changes consistent with microperforation.    Plan:  - Pain control  - Continue antibiotics  - On CLD this AM  - RUQ discussed yesterday given elevated LFTs  - Given patient's long history of diverticulitis, will transfer the patient to a colorectal surgery service ([facility])  - Please reconsult ACS/Trauma Surgery with any questions or concerns      ACS/Trauma Surgery  [phone]

## 2021-08-07 NOTE — CONSULT NOTE ADULT - ASSESSMENT
51F with diverticulitis with possible microperforation, improving on abx.    - Will transfer to the colorectal service under Dr. Love.  - Advance diet to low residue  - transition to PO Cipro/Flag

## 2021-08-07 NOTE — PROGRESS NOTE ADULT - SUBJECTIVE AND OBJECTIVE BOX
Surgery Progress Note     Subjective/24hour Events:   Patient seen and examined at bedside on AM rounds. No acute events overnight. Pain is improving. Patient states that she has an ~10 year history of diverticulitis flares.    Vital Signs:  Vital Signs Last 24 Hrs  T(C): 36.9 (07 Aug 2021 09:41), Max: 37.1 (06 Aug 2021 16:36)  T(F): 98.5 (07 Aug 2021 09:41), Max: 98.7 (06 Aug 2021 16:36)  HR: 79 (07 Aug 2021 09:41) (70 - 103)  BP: 123/86 (07 Aug 2021 09:41) (113/77 - 131/100)  BP(mean): --  RR: 16 (07 Aug 2021 09:41) (14 - 18)  SpO2: 99% (07 Aug 2021 09:41) (96% - 100%)    CAPILLARY BLOOD GLUCOSE          I&O's Detail    07 Aug 2021 07:01  -  07 Aug 2021 14:08  --------------------------------------------------------  IN:  Total IN: 0 mL    OUT:    Voided (mL): 800 mL  Total OUT: 800 mL    Total NET: -800 mL          Physical Exam:  Gen: NAD, resting comfortably in bed  CV: Regular rate  Lungs: Non labored breathing on room air  Ab: Soft, nondistended, LLQ tender to deep palpation  Ext: Moves all 4 spontaneously, warm and well perfused    Labs:    08-07    139  |  103  |  7   ----------------------------<  89  3.9   |  22  |  0.55    Ca    8.9      07 Aug 2021 07:18  Phos  3.1     08-07  Mg     2.1     08-07    TPro  6.5  /  Alb  3.6  /  TBili  0.3  /  DBili  x   /  AST  27  /  ALT  45  /  AlkPhos  165<H>  08-07    LIVER FUNCTIONS - ( 07 Aug 2021 07:18 )  Alb: 3.6 g/dL / Pro: 6.5 g/dL / ALK PHOS: 165 U/L / ALT: 45 U/L / AST: 27 U/L / GGT: x                                 12.4   4.76  )-----------( 295      ( 07 Aug 2021 07:13 )             36.4     PT/INR - ( 06 Aug 2021 19:30 )   PT: 14.0 sec;   INR: 1.18 ratio         PTT - ( 06 Aug 2021 19:30 )  PTT:31.8 sec
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
## 2021-08-07 NOTE — CONSULT NOTE ADULT - ATTENDING COMMENTS
Recent parathyroid surgery  Presenting w/ abdominal pain w/ CT finding of acute diverticulitis unclear if possible phlegmon/early abscess implying possible microperforation  Last (and only prior) episode 8yrs ago. Last colonoscopy 1yr ago w/ benign polyps    abd soft, non-distended, non-tender to palpation  no incisions    - CLD this morning, advance to LFD later today  - transition to PO abx  - monitor abd exam    Kiran Love MD  Attending Physician

## 2021-08-07 NOTE — CONSULT NOTE ADULT - SUBJECTIVE AND OBJECTIVE BOX
51F with a hx of hyperparathyroidism s/p parathyroidectomy and diverticulitis (1 episode 8 years previously, treated outpatient) admitted to the surgical service with acute onset of pain 2/2 diverticulitis with phlegmon. She was started on IV abx and transitioned to CLD and has thus far improved. Last colonoscopy was 1 year previously and only positive for several benign polyps. Denies any abdominal surgical hx.     NKDA    Vital Signs Last 24 Hrs  T(C): 36.9 (07 Aug 2021 09:41), Max: 37.1 (06 Aug 2021 16:36)  T(F): 98.5 (07 Aug 2021 09:41), Max: 98.7 (06 Aug 2021 16:36)  HR: 79 (07 Aug 2021 09:41) (70 - 103)  BP: 123/86 (07 Aug 2021 09:41) (113/77 - 131/100)  BP(mean): --  RR: 16 (07 Aug 2021 09:41) (14 - 18)  SpO2: 99% (07 Aug 2021 09:41) (96% - 100%)    PHYSICAL EXAM:    Gen: Age appropriate female in NAD  Neck: small collar incision clean, dry, and steri intact  Abd: Soft, minimal distention, mild tenderness to deep palpation in the LLQ  Ext: WWP        LABS:                        12.4   4.76  )-----------( 295      ( 07 Aug 2021 07:13 )             36.4     08-07    139  |  103  |  7   ----------------------------<  89  3.9   |  22  |  0.55    Ca    8.9      07 Aug 2021 07:18  Phos  3.1     08-07  Mg     2.1     08-07    TPro  6.5  /  Alb  3.6  /  TBili  0.3  /  DBili  x   /  AST  27  /  ALT  45  /  AlkPhos  165<H>  08-07    PT/INR - ( 06 Aug 2021 19:30 )   PT: 14.0 sec;   INR: 1.18 ratio         PTT - ( 06 Aug 2021 19:30 )  PTT:31.8 sec  Urinalysis Basic - ( 06 Aug 2021 23:53 )    Color: Colorless / Appearance: Clear / S.047 / pH: x  Gluc: x / Ketone: Negative  / Bili: Negative / Urobili: Negative   Blood: x / Protein: Negative / Nitrite: Negative   Leuk Esterase: Negative / RBC: x / WBC x   Sq Epi: x / Non Sq Epi: x / Bacteria: x        RADIOLOGY & ADDITIONAL STUDIES:  < from: CT Abdomen and Pelvis w/ IV Cont (21 @ 21:08) >    Acute sigmoid and mid descending colonic diverticulitis with associated phlegmonous change likely due to contained microperforation. No focal fluid collection.    < end of copied text >

## 2021-08-08 ENCOUNTER — TRANSCRIPTION ENCOUNTER (OUTPATIENT)
Age: 51
End: 2021-08-08

## 2021-08-08 VITALS
DIASTOLIC BLOOD PRESSURE: 69 MMHG | OXYGEN SATURATION: 94 % | RESPIRATION RATE: 18 BRPM | HEART RATE: 70 BPM | TEMPERATURE: 99 F | SYSTOLIC BLOOD PRESSURE: 100 MMHG

## 2021-08-08 LAB
ALBUMIN SERPL ELPH-MCNC: 3.7 G/DL — SIGNIFICANT CHANGE UP (ref 3.3–5)
ALP SERPL-CCNC: 185 U/L — HIGH (ref 40–120)
ALT FLD-CCNC: 43 U/L — SIGNIFICANT CHANGE UP (ref 10–45)
ANION GAP SERPL CALC-SCNC: 14 MMOL/L — SIGNIFICANT CHANGE UP (ref 5–17)
AST SERPL-CCNC: 25 U/L — SIGNIFICANT CHANGE UP (ref 10–40)
BILIRUB SERPL-MCNC: 0.3 MG/DL — SIGNIFICANT CHANGE UP (ref 0.2–1.2)
BUN SERPL-MCNC: 6 MG/DL — LOW (ref 7–23)
CALCIUM SERPL-MCNC: 8.9 MG/DL — SIGNIFICANT CHANGE UP (ref 8.4–10.5)
CHLORIDE SERPL-SCNC: 103 MMOL/L — SIGNIFICANT CHANGE UP (ref 96–108)
CO2 SERPL-SCNC: 21 MMOL/L — LOW (ref 22–31)
COVID-19 SPIKE DOMAIN AB INTERP: POSITIVE
COVID-19 SPIKE DOMAIN ANTIBODY RESULT: >250 U/ML — HIGH
CREAT SERPL-MCNC: 0.65 MG/DL — SIGNIFICANT CHANGE UP (ref 0.5–1.3)
CULTURE RESULTS: NO GROWTH — SIGNIFICANT CHANGE UP
GLUCOSE SERPL-MCNC: 127 MG/DL — HIGH (ref 70–99)
HCT VFR BLD CALC: 37.7 % — SIGNIFICANT CHANGE UP (ref 34.5–45)
HGB BLD-MCNC: 12.8 G/DL — SIGNIFICANT CHANGE UP (ref 11.5–15.5)
MAGNESIUM SERPL-MCNC: 2 MG/DL — SIGNIFICANT CHANGE UP (ref 1.6–2.6)
MCHC RBC-ENTMCNC: 32.4 PG — SIGNIFICANT CHANGE UP (ref 27–34)
MCHC RBC-ENTMCNC: 34 GM/DL — SIGNIFICANT CHANGE UP (ref 32–36)
MCV RBC AUTO: 95.4 FL — SIGNIFICANT CHANGE UP (ref 80–100)
NRBC # BLD: 0 /100 WBCS — SIGNIFICANT CHANGE UP (ref 0–0)
PHOSPHATE SERPL-MCNC: 3 MG/DL — SIGNIFICANT CHANGE UP (ref 2.5–4.5)
PLATELET # BLD AUTO: 326 K/UL — SIGNIFICANT CHANGE UP (ref 150–400)
POTASSIUM SERPL-MCNC: 4.2 MMOL/L — SIGNIFICANT CHANGE UP (ref 3.5–5.3)
POTASSIUM SERPL-SCNC: 4.2 MMOL/L — SIGNIFICANT CHANGE UP (ref 3.5–5.3)
PROT SERPL-MCNC: 6.8 G/DL — SIGNIFICANT CHANGE UP (ref 6–8.3)
RBC # BLD: 3.95 M/UL — SIGNIFICANT CHANGE UP (ref 3.8–5.2)
RBC # FLD: 11.2 % — SIGNIFICANT CHANGE UP (ref 10.3–14.5)
SARS-COV-2 IGG+IGM SERPL QL IA: >250 U/ML — HIGH
SARS-COV-2 IGG+IGM SERPL QL IA: POSITIVE
SODIUM SERPL-SCNC: 138 MMOL/L — SIGNIFICANT CHANGE UP (ref 135–145)
SPECIMEN SOURCE: SIGNIFICANT CHANGE UP
WBC # BLD: 5.38 K/UL — SIGNIFICANT CHANGE UP (ref 3.8–10.5)
WBC # FLD AUTO: 5.38 K/UL — SIGNIFICANT CHANGE UP (ref 3.8–10.5)

## 2021-08-08 PROCEDURE — G1004: CPT

## 2021-08-08 PROCEDURE — 85027 COMPLETE CBC AUTOMATED: CPT

## 2021-08-08 PROCEDURE — 74177 CT ABD & PELVIS W/CONTRAST: CPT | Mod: ME

## 2021-08-08 PROCEDURE — U0003: CPT

## 2021-08-08 PROCEDURE — 86900 BLOOD TYPING SEROLOGIC ABO: CPT

## 2021-08-08 PROCEDURE — 84100 ASSAY OF PHOSPHORUS: CPT

## 2021-08-08 PROCEDURE — 84295 ASSAY OF SERUM SODIUM: CPT

## 2021-08-08 PROCEDURE — U0005: CPT

## 2021-08-08 PROCEDURE — 96374 THER/PROPH/DIAG INJ IV PUSH: CPT

## 2021-08-08 PROCEDURE — 85610 PROTHROMBIN TIME: CPT

## 2021-08-08 PROCEDURE — 82435 ASSAY OF BLOOD CHLORIDE: CPT

## 2021-08-08 PROCEDURE — 99231 SBSQ HOSP IP/OBS SF/LOW 25: CPT

## 2021-08-08 PROCEDURE — 86769 SARS-COV-2 COVID-19 ANTIBODY: CPT

## 2021-08-08 PROCEDURE — 82947 ASSAY GLUCOSE BLOOD QUANT: CPT

## 2021-08-08 PROCEDURE — 87086 URINE CULTURE/COLONY COUNT: CPT

## 2021-08-08 PROCEDURE — 83735 ASSAY OF MAGNESIUM: CPT

## 2021-08-08 PROCEDURE — 85018 HEMOGLOBIN: CPT

## 2021-08-08 PROCEDURE — 85730 THROMBOPLASTIN TIME PARTIAL: CPT

## 2021-08-08 PROCEDURE — 86850 RBC ANTIBODY SCREEN: CPT

## 2021-08-08 PROCEDURE — 84702 CHORIONIC GONADOTROPIN TEST: CPT

## 2021-08-08 PROCEDURE — 81003 URINALYSIS AUTO W/O SCOPE: CPT

## 2021-08-08 PROCEDURE — 99285 EMERGENCY DEPT VISIT HI MDM: CPT | Mod: 25

## 2021-08-08 PROCEDURE — 80053 COMPREHEN METABOLIC PANEL: CPT

## 2021-08-08 PROCEDURE — 87040 BLOOD CULTURE FOR BACTERIA: CPT

## 2021-08-08 PROCEDURE — 86901 BLOOD TYPING SEROLOGIC RH(D): CPT

## 2021-08-08 PROCEDURE — 82803 BLOOD GASES ANY COMBINATION: CPT

## 2021-08-08 PROCEDURE — 84132 ASSAY OF SERUM POTASSIUM: CPT

## 2021-08-08 PROCEDURE — 85025 COMPLETE CBC W/AUTO DIFF WBC: CPT

## 2021-08-08 PROCEDURE — 83605 ASSAY OF LACTIC ACID: CPT

## 2021-08-08 PROCEDURE — 82330 ASSAY OF CALCIUM: CPT

## 2021-08-08 PROCEDURE — 85014 HEMATOCRIT: CPT

## 2021-08-08 RX ORDER — ACETAMINOPHEN 500 MG
3 TABLET ORAL
Qty: 0 | Refills: 0 | DISCHARGE
Start: 2021-08-08

## 2021-08-08 RX ORDER — CIPROFLOXACIN LACTATE 400MG/40ML
1 VIAL (ML) INTRAVENOUS
Qty: 0 | Refills: 0 | DISCHARGE
Start: 2021-08-08

## 2021-08-08 RX ORDER — MOXIFLOXACIN HYDROCHLORIDE TABLETS, 400 MG 400 MG/1
1 TABLET, FILM COATED ORAL
Qty: 16 | Refills: 0
Start: 2021-08-08 | End: 2021-08-15

## 2021-08-08 RX ORDER — METRONIDAZOLE 500 MG
1 TABLET ORAL
Qty: 0 | Refills: 0 | DISCHARGE
Start: 2021-08-08

## 2021-08-08 RX ORDER — METRONIDAZOLE 500 MG
1 TABLET ORAL
Qty: 24 | Refills: 0
Start: 2021-08-08 | End: 2021-08-15

## 2021-08-08 RX ADMIN — Medication 500 MILLIGRAM(S): at 13:13

## 2021-08-08 RX ADMIN — Medication 500 MILLIGRAM(S): at 05:25

## 2021-08-08 RX ADMIN — Medication 500 MILLIGRAM(S): at 05:26

## 2021-08-08 NOTE — DISCHARGE NOTE PROVIDER - NSDCCPCAREPLAN_GEN_ALL_CORE_FT
PRINCIPAL DISCHARGE DIAGNOSIS  Diagnosis: Diverticulitis  Assessment and Plan of Treatment: Treated conservatively without surgery intervention. Continue oral antibiotics for 8 more days.      SECONDARY DISCHARGE DIAGNOSES  Diagnosis: Perforation of intestine  Assessment and Plan of Treatment: Treated conservatively without surgery intervention. Continue oral antibiotics for 8 more days.

## 2021-08-08 NOTE — PROGRESS NOTE ADULT - SUBJECTIVE AND OBJECTIVE BOX
TEAM Surgery Progress Note  Patient is a 51y old  Female who presents with a chief complaint of Diverticulitis (07 Aug 2021 14:07)      INTERVAL EVENTS: No acute events overnight. Patient had headache, similar in presentation to history of previous migraines, which was treated with IV tylenol.  SUBJECTIVE: On morning rounds...    OBJECTIVE:    Vital Signs Last 24 Hrs  T(C): 36.9 (08 Aug 2021 01:13), Max: 37.2 (07 Aug 2021 20:50)  T(F): 98.5 (08 Aug 2021 01:13), Max: 99 (07 Aug 2021 20:50)  HR: 93 (08 Aug 2021 01:13) (76 - 94)  BP: 122/80 (08 Aug 2021 01:13) (104/72 - 131/100)  BP(mean): --  RR: 18 (08 Aug 2021 01:13) (14 - 18)  SpO2: 96% (08 Aug 2021 01:) (94% - 100%)I&O's Detail    07 Aug 2021 07:01  -  08 Aug 2021 02:06  --------------------------------------------------------  IN:    Oral Fluid: 820 mL  Total IN: 820 mL    OUT:    Voided (mL): 1900 mL  Total OUT: 1900 mL    Total NET: -1080 mL      MEDICATIONS  (STANDING):  acetaminophen   Tablet .. 975 milliGRAM(s) Oral once  atorvastatin 40 milliGRAM(s) Oral at bedtime  ciprofloxacin     Tablet 500 milliGRAM(s) Oral every 12 hours  dextrose 5% + sodium chloride 0.45% with potassium chloride 20 mEq/L 1000 milliLiter(s) (50 mL/Hr) IV Continuous <Continuous>  enoxaparin Injectable 40 milliGRAM(s) SubCutaneous daily  metroNIDAZOLE    Tablet 500 milliGRAM(s) Oral every 8 hours    MEDICATIONS  (PRN):      PHYSICAL EXAM:  Constitutional: A&Ox3, NAD  Respiratory: Unlabored breathing  Abdomen: Soft, nondistended, NTTP. No rebound or guarding.  Extremities: WWP, ARENAS spontaneously    LABS:                        12.4   4.76  )-----------( 295      ( 07 Aug 2021 07:13 )             36.4     08-    139  |  103  |  7   ----------------------------<  89  3.9   |  22  |  0.55    Ca    8.9      07 Aug 2021 07:18  Phos  3.1       Mg     2.1     -    TPro  6.5  /  Alb  3.6  /  TBili  0.3  /  DBili  x   /  AST  27  /  ALT  45  /  AlkPhos  165<H>  08-    PT/INR - ( 06 Aug 2021 19:30 )   PT: 14.0 sec;   INR: 1.18 ratio         PTT - ( 06 Aug 2021 19:30 )  PTT:31.8 sec  LIVER FUNCTIONS - ( 07 Aug 2021 07:18 )  Alb: 3.6 g/dL / Pro: 6.5 g/dL / ALK PHOS: 165 U/L / ALT: 45 U/L / AST: 27 U/L / GGT: x           Urinalysis Basic - ( 06 Aug 2021 23:53 )    Color: Colorless / Appearance: Clear / S.047 / pH: x  Gluc: x / Ketone: Negative  / Bili: Negative / Urobili: Negative   Blood: x / Protein: Negative / Nitrite: Negative   Leuk Esterase: Negative / RBC: x / WBC x   Sq Epi: x / Non Sq Epi: x / Bacteria: x          IMAGING:     TEAM Surgery Progress Note  Patient is a 51y old  Female who presents with a chief complaint of Diverticulitis (07 Aug 2021 14:07)      INTERVAL EVENTS: No acute events overnight. Patient had headache, similar in presentation to history of previous migraines, which was treated with IV tylenol.  SUBJECTIVE: On morning rounds, patient is feeling well. Tolerated low fiber diet. Ambulating. No abdominal pain. No n/v. passing gas, had a bowel movement yesterday.     OBJECTIVE:    Vital Signs Last 24 Hrs  T(C): 36.9 (08 Aug 2021 01:13), Max: 37.2 (07 Aug 2021 20:50)  T(F): 98.5 (08 Aug 2021 01:), Max: 99 (07 Aug 2021 20:50)  HR: 93 (08 Aug 2021 01:13) (76 - 94)  BP: 122/80 (08 Aug 2021 01:) (104/72 - 131/100)  BP(mean): --  RR: 18 (08 Aug 2021 01:) (14 - 18)  SpO2: 96% (08 Aug 2021 01:) (94% - 100%)I&O's Detail    07 Aug 2021 07:01  -  08 Aug 2021 02:06  --------------------------------------------------------  IN:    Oral Fluid: 820 mL  Total IN: 820 mL    OUT:    Voided (mL): 1900 mL  Total OUT: 1900 mL    Total NET: -1080 mL      MEDICATIONS  (STANDING):  acetaminophen   Tablet .. 975 milliGRAM(s) Oral once  atorvastatin 40 milliGRAM(s) Oral at bedtime  ciprofloxacin     Tablet 500 milliGRAM(s) Oral every 12 hours  dextrose 5% + sodium chloride 0.45% with potassium chloride 20 mEq/L 1000 milliLiter(s) (50 mL/Hr) IV Continuous <Continuous>  enoxaparin Injectable 40 milliGRAM(s) SubCutaneous daily  metroNIDAZOLE    Tablet 500 milliGRAM(s) Oral every 8 hours    MEDICATIONS  (PRN):      PHYSICAL EXAM:  Constitutional: A&Ox3, NAD  Respiratory: Unlabored breathing  Abdomen: Soft, nondistended, NTTP. No rebound or guarding.  Extremities: WWP, ARENAS spontaneously    LABS:                        12.4   4.76  )-----------( 295      ( 07 Aug 2021 07:13 )             36.4     08-    139  |  103  |  7   ----------------------------<  89  3.9   |  22  |  0.55    Ca    8.9      07 Aug 2021 07:18  Phos  3.1     08-  Mg     2.1     08-    TPro  6.5  /  Alb  3.6  /  TBili  0.3  /  DBili  x   /  AST  27  /  ALT  45  /  AlkPhos  165<H>  08-    PT/INR - ( 06 Aug 2021 19:30 )   PT: 14.0 sec;   INR: 1.18 ratio         PTT - ( 06 Aug 2021 19:30 )  PTT:31.8 sec  LIVER FUNCTIONS - ( 07 Aug 2021 07:18 )  Alb: 3.6 g/dL / Pro: 6.5 g/dL / ALK PHOS: 165 U/L / ALT: 45 U/L / AST: 27 U/L / GGT: x           Urinalysis Basic - ( 06 Aug 2021 23:53 )    Color: Colorless / Appearance: Clear / S.047 / pH: x  Gluc: x / Ketone: Negative  / Bili: Negative / Urobili: Negative   Blood: x / Protein: Negative / Nitrite: Negative   Leuk Esterase: Negative / RBC: x / WBC x   Sq Epi: x / Non Sq Epi: x / Bacteria: x

## 2021-08-08 NOTE — DISCHARGE NOTE PROVIDER - NSDCMRMEDTOKEN_GEN_ALL_CORE_FT
acetaminophen 325 mg oral tablet: 3 tab(s) orally once, As Needed  atorvastatin 40 mg oral tablet: 1 tab(s) orally once a day  ciprofloxacin 500 mg oral tablet: 1 tab(s) orally every 12 hours  metroNIDAZOLE 500 mg oral tablet: 1 tab(s) orally every 8 hours

## 2021-08-08 NOTE — DISCHARGE NOTE NURSING/CASE MANAGEMENT/SOCIAL WORK - PATIENT PORTAL LINK FT
You can access the FollowMyHealth Patient Portal offered by [facility] by registering at the following website: [email or web address]. By joining [facility]’s FollowMyHealth portal, you will also be able to view your health information using other applications (apps) compatible with our system.

## 2021-08-08 NOTE — DISCHARGE NOTE PROVIDER - NSDCFUADDAPPT_GEN_ALL_CORE_FT
Please follow up with Dr. Love in 1-2 weeks after discharge.   Please follow up with primary care physician after discharge.

## 2021-08-08 NOTE — DISCHARGE NOTE PROVIDER - HOSPITAL COURSE
Ms. Duncan is a 51 Year-Old Lady with recent parathyroidectomy last week, HLD, presenting with 4 days of abdominal pain which worsened on Per Os antibiotics, found to have diverticulitis with phlegmonous changes consistent with microperforation. Treatment conservatively with po cipro/flagyl from 8/7, clear liquid and ivf. Patient responded properly. At the time of discharge, no abdominal pain, tolerating low fiber diet, ambulating well.   Discharge today with 8 more days of cipro/flagyl. Follow up with Dr. Love in 1-2 weeks.

## 2021-08-08 NOTE — PROGRESS NOTE ADULT - ASSESSMENT
· Assessment	  Ms. Duncan is a 51 Year-Old Lady with recent parathyroidectomy last week, HLD, presenting with 4 days of abdominal pain which worsened on PO antibiotics, found to have diverticulitis with phlegmonous changes consistent with microperforation.    Plan:  - Pain control  - Continue antibiotics  - On CLD this AM  - RUQ discussed yesterday given elevated LFTs · Assessment	  Ms. Duncan is a 51 Year-Old Lady with recent parathyroidectomy last week, HLD, presenting with 4 days of abdominal pain which worsened on PO antibiotics, found to have diverticulitis with phlegmonous changes consistent with microperforation.    Plan:  - Pain control  - Continue antibiotics  - diet: LRD  - d/c today with 8 more days of po cipro/flagly

## 2021-08-11 ENCOUNTER — TRANSCRIPTION ENCOUNTER (OUTPATIENT)
Age: 51
End: 2021-08-11

## 2021-08-12 ENCOUNTER — APPOINTMENT (OUTPATIENT)
Dept: SURGERY | Facility: CLINIC | Age: 51
End: 2021-08-12
Payer: COMMERCIAL

## 2021-08-12 LAB
CULTURE RESULTS: SIGNIFICANT CHANGE UP
CULTURE RESULTS: SIGNIFICANT CHANGE UP
SPECIMEN SOURCE: SIGNIFICANT CHANGE UP
SPECIMEN SOURCE: SIGNIFICANT CHANGE UP

## 2021-08-12 PROCEDURE — 36415 COLL VENOUS BLD VENIPUNCTURE: CPT

## 2021-08-12 PROCEDURE — 99024 POSTOP FOLLOW-UP VISIT: CPT

## 2021-08-12 NOTE — PHYSICAL EXAM
[de-identified] : well healed scar [Midline] : located in midline position [Normal] : orientation to person, place, and time: normal

## 2021-08-12 NOTE — HISTORY OF PRESENT ILLNESS
[de-identified] : Pt 2 weeks s/p parathyroidectomy doing well without complaints was just in hospital for 3 days for diverticulitis feeling much better now

## 2021-08-13 ENCOUNTER — NON-APPOINTMENT (OUTPATIENT)
Age: 51
End: 2021-08-13

## 2021-08-13 LAB
25(OH)D3 SERPL-MCNC: 50.5 NG/ML
CALCIUM SERPL-MCNC: 8.9 MG/DL
CALCIUM SERPL-MCNC: 8.9 MG/DL
PARATHYROID HORMONE INTACT: 49 PG/ML

## 2021-08-26 ENCOUNTER — APPOINTMENT (OUTPATIENT)
Dept: SURGERY | Facility: CLINIC | Age: 51
End: 2021-08-26
Payer: COMMERCIAL

## 2021-08-26 PROCEDURE — 99441: CPT

## 2021-09-05 NOTE — ASSESSMENT
[FreeTextEntry1] : 51y.o. F w/ prior episode of diverticulitis many years ago w/ recent hospitalization for another episode which is unclear if it was uncomplicated or complicated.

## 2021-09-05 NOTE — HISTORY OF PRESENT ILLNESS
[FreeTextEntry1] : Note this was a telehealth appointment that lasted 10minutes.\par Ms. Shanta Duncan is a 51y.o. F w; recent parathyroid surgery and recent hospitalization for diverticulitis presenting for follow-up. She presented to [facility] ED w/ abdominal pain and was found on CT to have acute sigmoid and descending diverticulitis w/o any obvious sign of perforation but possible phlegmon. She improved quickly w/ antibiotics and was discharged home with PO antibiotics and tolerating a regular diet. Today she denies any recurrent abdominal pain and has completed the antibiotics.

## 2021-11-11 ENCOUNTER — APPOINTMENT (OUTPATIENT)
Dept: SURGERY | Facility: CLINIC | Age: 51
End: 2021-11-11
Payer: COMMERCIAL

## 2021-11-11 PROCEDURE — 99213 OFFICE O/P EST LOW 20 MIN: CPT | Mod: 25

## 2021-11-11 NOTE — PHYSICAL EXAM
[de-identified] : well healed scar [Midline] : located in midline position [Normal] : orientation to person, place, and time: normal [de-identified] : Neg Chvostek's sign

## 2021-11-12 ENCOUNTER — NON-APPOINTMENT (OUTPATIENT)
Age: 51
End: 2021-11-12

## 2021-11-12 LAB
T3 SERPL-MCNC: 102 NG/DL
T4 FREE SERPL-MCNC: 1.2 NG/DL
TSH SERPL-ACNC: 1.35 UIU/ML

## 2021-11-15 ENCOUNTER — NON-APPOINTMENT (OUTPATIENT)
Age: 51
End: 2021-11-15

## 2021-11-15 LAB
THYROGLOB AB SERPL-ACNC: <20 IU/ML
THYROPEROXIDASE AB SERPL IA-ACNC: 105 IU/ML

## 2022-01-22 NOTE — PATIENT PROFILE ADULT - HISTORY OF COVID-19 VACCINATION
LABS:                         11.7   24.53 )-----------( 166      ( 2022 18:25 )             36.8         138  |  103  |  26<H>  ----------------------------<  122<H>  4.0   |  22  |  1.59<H>    Ca    8.5      2022 18:25    TPro  6.4  /  Alb  2.9<L>  /  TBili  0.8  /  DBili  x   /  AST  20  /  ALT  15  /  AlkPhos  84      PT/INR - ( 2022 18:25 )   PT: 15.1 sec;   INR: 1.27          PTT - ( 2022 18:25 )  PTT:28.2 sec  Urinalysis Basic - ( 2022 19:37 )    Color: Yellow / Appearance: SL Cloudy / S.025 / pH: x  Gluc: x / Ketone: NEGATIVE  / Bili: Negative / Urobili: 0.2 E.U./dL   Blood: x / Protein: 30 mg/dL / Nitrite: NEGATIVE   Leuk Esterase: NEGATIVE / RBC: Many /HPF / WBC < 5 /HPF   Sq Epi: x / Non Sq Epi: x / Bacteria: Few /HPF      CARDIAC MARKERS ( 2022 18:25 )  x     / 0.01 ng/mL / 71 U/L / x     / 1.7 ng/mL      Serum Pro-Brain Natriuretic Peptide: 2006 pg/mL ( @ 18:25)    Lactate, Blood: 1.0 mmol/L ( @ 19:32)      RADIOLOGY, EKG & ADDITIONAL TESTS: Yes

## 2022-05-12 ENCOUNTER — APPOINTMENT (OUTPATIENT)
Dept: SURGERY | Facility: CLINIC | Age: 52
End: 2022-05-12
Payer: COMMERCIAL

## 2022-05-12 DIAGNOSIS — E21.0 PRIMARY HYPERPARATHYROIDISM: ICD-10-CM

## 2022-05-12 PROCEDURE — 99213 OFFICE O/P EST LOW 20 MIN: CPT | Mod: 25

## 2022-05-12 NOTE — PHYSICAL EXAM
[de-identified] : well healed scar [Midline] : located in midline position [Normal] : orientation to person, place, and time: normal [de-identified] : Left Chovstek's sign

## 2022-05-12 NOTE — ASSESSMENT
[FreeTextEntry1] : s/p parathyroidectomy\par daily care\par labs in office today\par f/u Dr Cali and Dr Le\par f/u prn

## 2022-05-13 ENCOUNTER — NON-APPOINTMENT (OUTPATIENT)
Age: 52
End: 2022-05-13

## 2022-05-13 LAB
25(OH)D3 SERPL-MCNC: 40.9 NG/ML
CALCIUM SERPL-MCNC: 10 MG/DL
CALCIUM SERPL-MCNC: 10 MG/DL
PARATHYROID HORMONE INTACT: 58 PG/ML

## 2022-05-16 ENCOUNTER — NON-APPOINTMENT (OUTPATIENT)
Age: 52
End: 2022-05-16

## 2022-06-07 NOTE — PATIENT PROFILE ADULT - NSPRESCRUSEDDRG_GEN_A_NUR
Rapid strep negative  Strep PCR sent to lab  Covid PCR sent to lab  Discussed and encouraged supportive treatment  Encouraged follow up with PCP   No

## 2023-09-13 NOTE — ASU PATIENT PROFILE, ADULT - NS TRANSFER PATIENT BELONGINGS
Is The Patient Presenting As Previously Scheduled?: No, they are a work-in Is This A New Presentation, Or A Follow-Up?: Rash Additional History: Patient was diagnosed with stage 4 lung cancer around the time this rash started. She also started the medication Keytruda around this time as well. Cell Phone/PDA (specify)/Clothing

## 2024-03-22 ENCOUNTER — RX ONLY (RX ONLY)
Age: 54
End: 2024-03-22

## 2024-03-22 ENCOUNTER — OFFICE (OUTPATIENT)
Dept: URBAN - METROPOLITAN AREA CLINIC 35 | Facility: CLINIC | Age: 54
Setting detail: OPHTHALMOLOGY
End: 2024-03-22
Payer: COMMERCIAL

## 2024-03-22 DIAGNOSIS — H01.00B: ICD-10-CM

## 2024-03-22 DIAGNOSIS — H00.11: ICD-10-CM

## 2024-03-22 DIAGNOSIS — H01.00A: ICD-10-CM

## 2024-03-22 PROBLEM — L71.0 ROSACEA: Status: ACTIVE | Noted: 2024-03-22

## 2024-03-22 PROBLEM — H01.001 BLEPHARITIS; RIGHT UPPER LID, LEFT UPPER LID: Status: ACTIVE | Noted: 2024-03-22

## 2024-03-22 PROBLEM — H01.004 BLEPHARITIS; RIGHT UPPER LID, LEFT UPPER LID: Status: ACTIVE | Noted: 2024-03-22

## 2024-03-22 PROCEDURE — 92285 EXTERNAL OCULAR PHOTOGRAPHY: CPT | Performed by: OPHTHALMOLOGY

## 2024-03-22 PROCEDURE — 67800 REMOVE EYELID LESION: CPT | Mod: E3 | Performed by: OPHTHALMOLOGY

## 2024-03-22 PROCEDURE — 92002 INTRM OPH EXAM NEW PATIENT: CPT | Mod: 25 | Performed by: OPHTHALMOLOGY

## 2024-03-22 ASSESSMENT — LID EXAM ASSESSMENTS
OS_BLEPHARITIS: 1+
OD_BLEPHARITIS: 1+

## 2024-04-06 ENCOUNTER — OFFICE (OUTPATIENT)
Dept: URBAN - METROPOLITAN AREA CLINIC 35 | Facility: CLINIC | Age: 54
Setting detail: OPHTHALMOLOGY
End: 2024-04-06
Payer: COMMERCIAL

## 2024-04-06 DIAGNOSIS — L71.0: ICD-10-CM

## 2024-04-06 DIAGNOSIS — H00.11: ICD-10-CM

## 2024-04-06 DIAGNOSIS — H01.00A: ICD-10-CM

## 2024-04-06 DIAGNOSIS — H01.00B: ICD-10-CM

## 2024-04-06 PROBLEM — H01.001 BLEPHARITIS; RIGHT UPPER LID, LEFT UPPER LID: Status: ACTIVE | Noted: 2024-04-06

## 2024-04-06 PROBLEM — H01.004 BLEPHARITIS; RIGHT UPPER LID, LEFT UPPER LID: Status: ACTIVE | Noted: 2024-04-06

## 2024-04-06 PROCEDURE — 92012 INTRM OPH EXAM EST PATIENT: CPT | Performed by: OPHTHALMOLOGY

## 2024-04-06 ASSESSMENT — LID EXAM ASSESSMENTS
OS_BLEPHARITIS: 1+
OD_BLEPHARITIS: 1+

## 2024-11-07 NOTE — ED ADULT NURSE NOTE - ED CARDIAC RHYTHM
We have reached out to Ms. Claire to inform her of the [employer] application process for Digna Morfin and whats required to apply.  She did not answer.         I left a voicemail   I mailed a letter and sent a portal message       Follow-up will be made in 5 business days.    Charley Poole  Pharmacy Patient Assistance Team  
regular

## 2025-04-21 ENCOUNTER — NON-APPOINTMENT (OUTPATIENT)
Age: 55
End: 2025-04-21

## 2025-04-22 ENCOUNTER — APPOINTMENT (OUTPATIENT)
Dept: ULTRASOUND IMAGING | Facility: CLINIC | Age: 55
End: 2025-04-22